# Patient Record
Sex: MALE | Race: WHITE | Employment: FULL TIME | URBAN - METROPOLITAN AREA
[De-identification: names, ages, dates, MRNs, and addresses within clinical notes are randomized per-mention and may not be internally consistent; named-entity substitution may affect disease eponyms.]

---

## 2021-07-14 ENCOUNTER — HOSPITAL ENCOUNTER (EMERGENCY)
Age: 31
Discharge: HOME | End: 2021-07-14
Attending: EMERGENCY MEDICINE | Admitting: EMERGENCY MEDICINE
Payer: COMMERCIAL

## 2021-07-14 VITALS
SYSTOLIC BLOOD PRESSURE: 140 MMHG | OXYGEN SATURATION: 98 % | HEART RATE: 102 BPM | HEIGHT: 71 IN | TEMPERATURE: 98.6 F | WEIGHT: 182 LBS | BODY MASS INDEX: 25.48 KG/M2 | DIASTOLIC BLOOD PRESSURE: 95 MMHG | RESPIRATION RATE: 16 BRPM

## 2021-07-14 DIAGNOSIS — M62.81 GENERALIZED MUSCLE WEAKNESS: ICD-10-CM

## 2021-07-14 DIAGNOSIS — G25.0 BENIGN ESSENTIAL TREMOR: Primary | ICD-10-CM

## 2021-07-14 LAB
ALBUMIN SERPL-MCNC: 4.4 G/DL (ref 3.5–5)
ALBUMIN/GLOB SERPL: 1.2 {RATIO} (ref 1.2–3.5)
ALP SERPL-CCNC: 96 U/L (ref 50–136)
ALT SERPL-CCNC: 40 U/L (ref 12–65)
ANION GAP SERPL CALC-SCNC: 6 MMOL/L (ref 7–16)
AST SERPL-CCNC: 26 U/L (ref 15–37)
BASOPHILS # BLD: 0.1 K/UL (ref 0–0.2)
BASOPHILS NFR BLD: 1 % (ref 0–2)
BILIRUB SERPL-MCNC: 0.5 MG/DL (ref 0.2–1.1)
BUN SERPL-MCNC: 9 MG/DL (ref 6–23)
CALCIUM SERPL-MCNC: 10 MG/DL (ref 8.3–10.4)
CHLORIDE SERPL-SCNC: 105 MMOL/L (ref 98–107)
CK SERPL-CCNC: 145 U/L (ref 21–215)
CO2 SERPL-SCNC: 28 MMOL/L (ref 21–32)
CREAT SERPL-MCNC: 0.99 MG/DL (ref 0.8–1.5)
CRP SERPL-MCNC: <0.3 MG/DL (ref 0–0.9)
DIFFERENTIAL METHOD BLD: NORMAL
EOSINOPHIL # BLD: 0.2 K/UL (ref 0–0.8)
EOSINOPHIL NFR BLD: 3 % (ref 0.5–7.8)
ERYTHROCYTE [DISTWIDTH] IN BLOOD BY AUTOMATED COUNT: 12.6 % (ref 11.9–14.6)
GLOBULIN SER CALC-MCNC: 3.8 G/DL (ref 2.3–3.5)
GLUCOSE SERPL-MCNC: 91 MG/DL (ref 65–100)
HCT VFR BLD AUTO: 44.7 % (ref 41.1–50.3)
HGB BLD-MCNC: 15.4 G/DL (ref 13.6–17.2)
IMM GRANULOCYTES # BLD AUTO: 0 K/UL (ref 0–0.5)
IMM GRANULOCYTES NFR BLD AUTO: 0 % (ref 0–5)
LYMPHOCYTES # BLD: 2.1 K/UL (ref 0.5–4.6)
LYMPHOCYTES NFR BLD: 23 % (ref 13–44)
MCH RBC QN AUTO: 29.8 PG (ref 26.1–32.9)
MCHC RBC AUTO-ENTMCNC: 34.5 G/DL (ref 31.4–35)
MCV RBC AUTO: 86.6 FL (ref 79.6–97.8)
MONOCYTES # BLD: 0.6 K/UL (ref 0.1–1.3)
MONOCYTES NFR BLD: 7 % (ref 4–12)
NEUTS SEG # BLD: 6.2 K/UL (ref 1.7–8.2)
NEUTS SEG NFR BLD: 67 % (ref 43–78)
NRBC # BLD: 0 K/UL (ref 0–0.2)
PLATELET # BLD AUTO: 258 K/UL (ref 150–450)
PMV BLD AUTO: 10.6 FL (ref 9.4–12.3)
POTASSIUM SERPL-SCNC: 3.7 MMOL/L (ref 3.5–5.1)
PROT SERPL-MCNC: 8.2 G/DL (ref 6.3–8.2)
RBC # BLD AUTO: 5.16 M/UL (ref 4.23–5.6)
SODIUM SERPL-SCNC: 139 MMOL/L (ref 138–145)
WBC # BLD AUTO: 9.3 K/UL (ref 4.3–11.1)

## 2021-07-14 PROCEDURE — 82550 ASSAY OF CK (CPK): CPT

## 2021-07-14 PROCEDURE — 86140 C-REACTIVE PROTEIN: CPT

## 2021-07-14 PROCEDURE — 80053 COMPREHEN METABOLIC PANEL: CPT

## 2021-07-14 PROCEDURE — 99284 EMERGENCY DEPT VISIT MOD MDM: CPT

## 2021-07-14 PROCEDURE — 85025 COMPLETE CBC W/AUTO DIFF WBC: CPT

## 2021-07-14 NOTE — ED TRIAGE NOTES
Patient ambulatory to triage with mask in place. Patient reports weakness and head tick x 3 weeks. Pt was recently admitted and seen at [facility] and had no results.

## 2021-07-14 NOTE — ED PROVIDER NOTES
33-year-old  male with no significant past medical history presents the emergency department complaining of progressive weakness over the last year, much worse in the last month. Patient started having some tremors of his head and neck approximately 3 to 4 weeks ago. Was seen in the emergency department at that time, then admitted on the 30th for the same. Work-up including MRI of head and CTA of the head and neck were negative. Patient was evaluated by neurology in-house, and referred to Dr. Severo Stank of neurology in [place] for the 24th. Patient had a follow-up visit with her family doctor yesterday, case was discussed with Dr. Alexey Beasley of neurology with Emily Kirk who recommended the patient go to the ER for possible further work-up and consultation. Unfortunately, they were unable to get him here yesterday or during normal business hours today. Patient denies any headache or visual changes or paresthesias. It has become progressively worse with attempts to get out of bed or ambulate and tends to drag his right foot as well. There was a mild viral illness prior to the onset of the worsening of the weakness 3 weeks ago with some nausea vomiting and diarrhea. Patient has had progressive difficulty with sleeping at night though he feels extremely tired. There is no family history of neurologic disorders. Of note, on patient's first visit to the emergency department in [facility], he had elevated blood pressure and very erratic pulse often going very fast.  This has been improved with the blood pressure medicine. The history is provided by the patient and a parent. Extremity Weakness   This is a chronic problem. The current episode started more than 1 week ago. The problem occurs constantly. The problem has been gradually worsening. The patient is experiencing no pain.  Pertinent negatives include no numbness, full range of motion, no stiffness, no tingling, no itching, no back pain and no neck pain. The symptoms are aggravated by activity. He has tried rest for the symptoms. The treatment provided no relief. There has been no history of extremity trauma. No past medical history on file. No past surgical history on file. No family history on file. Social History     Socioeconomic History    Marital status: SINGLE     Spouse name: Not on file    Number of children: Not on file    Years of education: Not on file    Highest education level: Not on file   Occupational History    Not on file   Tobacco Use    Smoking status: Not on file   Substance and Sexual Activity    Alcohol use: Not on file    Drug use: Not on file    Sexual activity: Not on file   Other Topics Concern    Not on file   Social History Narrative    Not on file     Social Determinants of Health     Financial Resource Strain:     Difficulty of Paying Living Expenses:    Food Insecurity:     Worried About Running Out of Food in the Last Year:     [address] in the Last Year:    Transportation Needs:     Lack of Transportation (Medical):  Lack of Transportation (Non-Medical):    Physical Activity:     Days of Exercise per Week:     Minutes of Exercise per Session:    Stress:     Feeling of Stress :    Social Connections:     Frequency of Communication with Friends and Family:     Frequency of Social Gatherings with Friends and Family:     Attends Yazdanism Services:     Active Member of Clubs or Organizations:     Attends Club or Organization Meetings:     Marital Status:    Intimate Partner Violence:     Fear of Current or Ex-Partner:     Emotionally Abused:     Physically Abused:     Sexually Abused: ALLERGIES: Patient has no known allergies. Review of Systems   Constitutional: Positive for fatigue. Negative for diaphoresis and fever. Musculoskeletal: Negative for back pain, neck pain and stiffness. Skin: Negative for itching.    Neurological: Positive for dizziness, tremors, weakness and light-headedness (Intermittent with standing). Negative for tingling, seizures, syncope, facial asymmetry, speech difficulty, numbness and headaches. All other systems reviewed and are negative. Vitals:    07/14/21 1739   BP: (!) 135/96   Pulse: (!) 102   Resp: 16   Temp: 98.6 °F (37 °C)   SpO2: 99%   Weight: 82.6 kg (182 lb)   Height: 5' 11\" (1.803 m)            Physical Exam  Vitals and nursing note reviewed. Constitutional:       General: He is not in acute distress. Appearance: He is well-developed. HENT:      Head: Normocephalic and atraumatic. Right Ear: External ear normal.      Left Ear: External ear normal.   Eyes:      Conjunctiva/sclera: Conjunctivae normal.      Pupils: Pupils are equal, round, and reactive to light. Cardiovascular:      Rate and Rhythm: Normal rate and regular rhythm. Heart sounds: Normal heart sounds. Pulmonary:      Effort: Pulmonary effort is normal.      Breath sounds: Normal breath sounds. Abdominal:      General: Bowel sounds are normal.      Palpations: Abdomen is soft. Tenderness: There is no abdominal tenderness. Musculoskeletal:         General: Normal range of motion. Cervical back: Normal range of motion and neck supple. Right lower leg: No edema. Left lower leg: No edema. Skin:     General: Skin is warm and dry. Capillary Refill: Capillary refill takes less than 2 seconds. Neurological:      Mental Status: He is alert and oriented to person, place, and time. GCS: GCS eye subscore is 4. GCS verbal subscore is 5. GCS motor subscore is 6. Cranial Nerves: Cranial nerves are intact. Sensory: Sensation is intact. Motor: No abnormal muscle tone or pronator drift. Coordination: Coordination is intact. Deep Tendon Reflexes: Reflexes normal.      Comments: Patient's weakness appears more proximal than distal, and a bit worse in the lower extremities than the upper extremities. DTRs are present. Patient has a resting tremor in the neck and head. Psychiatric:         Mood and Affect: Affect normal. Mood is depressed. Speech: Speech normal.         Behavior: Behavior normal.         Cognition and Memory: Cognition and memory normal.          MDM  Number of Diagnoses or Management Options  Benign essential tremor: established and worsening  Generalized muscle weakness: established and worsening     Amount and/or Complexity of Data Reviewed  Clinical lab tests: ordered and reviewed  Tests in the radiology section of CPT®: reviewed  Obtain history from someone other than the patient: yes  Review and summarize past medical records: yes (Excerpt from the patient's last office visit yesterday:  Transitional Care Management [facility] Follow-up    Subjective   Date of Admission: (6/30/21)   Date of Discharge: 7/1/21 4:46 PM  Date of TCM Call: 7/2/2021    Chief Complaint   Patient presents with   [facility] Follow-up     HPI  Mahsa Ovalles is in the office today as a post discharge hospital follow-up for a mixed action and resting tremor. The patient had a sudden onset of this shaking movement back around the middle of June. He states that he does not use drugs he does not use alcohol and he does not recall having any type of injury that would have precipitated this tremor that he now has. In addition to the tremor the patient has an ataxic gait and he has weakness in his upper extremities. He is well-developed musculature wise and there should be no reason why his weakness is present. However he is obviously weak as he is unable to push my hands upward when I resist his movements. His recent admission did not reveal any answers as to why he is having the symptoms. He does have a follow-up with neurology however he states that that is an extended period away. The patient has been unable to work due to his symptoms and he now has no source of income because of that.     His symptoms are definitely perplexing with someone his age and the sudden onset. He has not had a spinal tap at this point. However he does not currently have a fever or chills. The ceruloplasmin level came back within normal limits. However he does acknowledge that on the Monday night prior to his symptoms beginning he had flulike symptoms with diarrhea and vomiting. He was tested for Estevan's disease. However a differential diagnosis should include some type of a viral encephalitis or bacterial encephalitis versus some other etiology. It should also be considered that a differential would include Khadijah-Barr virus and CIDP, Crutch-field Gonzalez disease, or vitamin B6 or B12 deficiency, infections of other causes or heavy metal poisoning. I spoke with Dr. Beth Martinez with neurology of [facility] system, and after presentation of the patient's current symptoms and his progression of this disease process his recommendation was to have the patient report to [facility] ED for an evaluation and then have them contact neurology for consult. Follow up Appointments:   Future Appointments   Date Time Provider Ashley Mistry   2021 1:30 PM [address] [place] [address] 18, 601 Doctor Gavin [facility] [place] [facility] from patient's last discharge summary at the end of :  Discharge Summaries  - documented in this encounter    Joselyn Holloway MD - 2021 3:07 PM EDT  Formatting of this note is different from the original.  Images from the original note were not included.     [address]  [phone]    Inpatient Medicine Discharge Summary     Patient: Francisco Alexander Admitted: 2021   : 1990 Discharged: 2021   CODE STATUS:Full Code Admitting Physician: MD Fabian Mac MD Discharging Physician: Joselyn Holloway MD     DISCHARGE DIAGNOSES   Principal Problem:  Mixed action and resting tremor  Active Problems:  Palpitation    SECONDARY/CHRONIC DIAGNOSES:     Past Medical History:   Diagnosis Date    Hypertension     CONSULTANTS:   Neurology    BRIEF SUMMARY/HOSPITAL COURSE   Estefania Pineda is a 36 y.o. male patient with no significant medical history presented for hospitalization as a direct admission with complaints of head and neck resting tremor. Patient has also been reporting that he is not sleeping at night for several weeks now. Etiology of patient's resting tremor at this point is unclear. Patient has been seen and evaluate by neurology Dr. Delores Arguelles. Both MRI head and CT angiography head and neck shows no acute finding including CT scan of the head. Neurology recommends follow-up with movement disorder clinic in [place] as well as outpatient neurology. Patient has been prescribed 0.5 mg of Klonopin nightly. Ceruloplasmin serum level has been obtained prior to discharge. SURGERY/PROCEDURES   MRI head, CT angiography head and neck    DISCHARGE MEDICATIONS       Discharge Medications     New Medications   Sig Disp Refill Start End   clonazePAM 0.5 mg tablet  Commonly known as: use for KlonoPIN  0.5 mg, Oral, Nightly  30 tablet  2      Medications To Continue   Sig Disp Refill Start End   aspirin-acetaminophen-caffeine 250-250-65 mg per tablet  Commonly known as: EXCEDRIN MIGRAINE  1 tablet, Oral, Every 8 hours PRN  0    melatonin 10 mg tablet  1 tablet, Oral, Nightly PRN  0    metoprolol tartrate 25 mg tablet  Commonly known as: use for LOPRESSOR  25 mg, Oral, 2 times daily  28 tablet  0        Ceruloplasmin was 23, within normal range. TSH, CBC, CMP all within normal limits, UDS also negative.)  Discuss the patient with other providers: yes (Patient evaluated by neurology here in the emergency department, did not recommend any kind of admission or further work-up from the ER other than possibly a CK level for the proximal muscle weakness.   Recommends further follow-up as scheduled with neurologist next week. At this time feels the tremor is a benign essential tremor, and the etiology for the weakness has been going on for a year is still unclear.)    Risk of Complications, Morbidity, and/or Mortality  Presenting problems: moderate  Diagnostic procedures: minimal  Management options: moderate    Patient Progress  Patient progress: stable    ED Course as of Jul 14 2041   Wed Jul 14, 2021 2034 Patient currently being evaluated by [facility] neurology via video    [BB]      ED Course User Index  [BB] Zenaida Sidhu MD       Procedures      Results Reviewed:      Recent Results (from the past 24 hour(s))   CBC WITH AUTOMATED DIFF    Collection Time: 07/14/21  5:42 PM   Result Value Ref Range    WBC 9.3 4.3 - 11.1 K/uL    RBC 5.16 4.23 - 5.6 M/uL    HGB 15.4 13.6 - 17.2 g/dL    HCT 44.7 41.1 - 50.3 %    MCV 86.6 79.6 - 97.8 FL    MCH 29.8 26.1 - 32.9 PG    MCHC 34.5 31.4 - 35.0 g/dL    RDW 12.6 11.9 - 14.6 %    PLATELET 872 081 - 974 K/uL    MPV 10.6 9.4 - 12.3 FL    ABSOLUTE NRBC 0.00 0.0 - 0.2 K/uL    DF AUTOMATED      NEUTROPHILS 67 43 - 78 %    LYMPHOCYTES 23 13 - 44 %    MONOCYTES 7 4.0 - 12.0 %    EOSINOPHILS 3 0.5 - 7.8 %    BASOPHILS 1 0.0 - 2.0 %    IMMATURE GRANULOCYTES 0 0.0 - 5.0 %    ABS. NEUTROPHILS 6.2 1.7 - 8.2 K/UL    ABS. LYMPHOCYTES 2.1 0.5 - 4.6 K/UL    ABS. MONOCYTES 0.6 0.1 - 1.3 K/UL    ABS. EOSINOPHILS 0.2 0.0 - 0.8 K/UL    ABS. BASOPHILS 0.1 0.0 - 0.2 K/UL    ABS. IMM.  GRANS. 0.0 0.0 - 0.5 K/UL   METABOLIC PANEL, COMPREHENSIVE    Collection Time: 07/14/21  5:42 PM   Result Value Ref Range    Sodium 139 138 - 145 mmol/L    Potassium 3.7 3.5 - 5.1 mmol/L    Chloride 105 98 - 107 mmol/L    CO2 28 21 - 32 mmol/L    Anion gap 6 (L) 7 - 16 mmol/L    Glucose 91 65 - 100 mg/dL    BUN 9 6 - 23 MG/DL    Creatinine 0.99 0.8 - 1.5 MG/DL    GFR est AA >60 >60 ml/min/1.73m2    GFR est non-AA >60 >60 ml/min/1.73m2    Calcium 10.0 8.3 - 10.4 MG/DL    Bilirubin, total 0.5 0.2 - 1.1 MG/DL    ALT (SGPT) 40 12 - 65 U/L    AST (SGOT) 26 15 - 37 U/L    Alk. phosphatase 96 50 - 136 U/L    Protein, total 8.2 6.3 - 8.2 g/dL    Albumin 4.4 3.5 - 5.0 g/dL    Globulin 3.8 (H) 2.3 - 3.5 g/dL    A-G Ratio 1.2 1.2 - 3.5     C REACTIVE PROTEIN, QT    Collection Time: 07/14/21  5:42 PM   Result Value Ref Range    C-Reactive protein <0.3 0.0 - 0.9 mg/dL       I discussed the results of all labs, procedures, radiographs, and treatments with the patient and available family. Treatment plan is agreed upon and the patient is ready for discharge. All voiced understanding of the discharge plan and medication instructions or changes as appropriate. Questions about treatment in the ED were answered. All were encouraged to return should symptoms worsen or new problems develop.

## 2021-07-15 NOTE — ED NOTES
I have reviewed discharge instructions with the patient. The patient verbalized understanding. Patient left ED via Discharge Method: ambulatory to Home with family. Opportunity for questions and clarification provided. Patient given 0 scripts. To continue your aftercare when you leave the hospital, you may receive an automated call from our care team to check in on how you are doing. This is a free service and part of our promise to provide the best care and service to meet your aftercare needs.  If you have questions, or wish to unsubscribe from this service please call [phone]. Thank you for Choosing our [facility] Emergency Department.